# Patient Record
Sex: FEMALE | Race: AMERICAN INDIAN OR ALASKA NATIVE
[De-identification: names, ages, dates, MRNs, and addresses within clinical notes are randomized per-mention and may not be internally consistent; named-entity substitution may affect disease eponyms.]

---

## 2017-12-07 ENCOUNTER — HOSPITAL ENCOUNTER (OUTPATIENT)
Dept: HOSPITAL 5 - TRG | Age: 32
Discharge: HOME | End: 2017-12-07
Attending: OBSTETRICS & GYNECOLOGY
Payer: COMMERCIAL

## 2017-12-07 VITALS — DIASTOLIC BLOOD PRESSURE: 63 MMHG | SYSTOLIC BLOOD PRESSURE: 108 MMHG

## 2017-12-07 DIAGNOSIS — Z3A.36: ICD-10-CM

## 2017-12-07 DIAGNOSIS — O47.03: Primary | ICD-10-CM

## 2017-12-07 PROCEDURE — 59025 FETAL NON-STRESS TEST: CPT

## 2017-12-14 ENCOUNTER — HOSPITAL ENCOUNTER (INPATIENT)
Dept: HOSPITAL 5 - APU | Age: 32
LOS: 3 days | Discharge: HOME | End: 2017-12-17
Attending: OBSTETRICS & GYNECOLOGY | Admitting: OBSTETRICS & GYNECOLOGY
Payer: COMMERCIAL

## 2017-12-14 DIAGNOSIS — D64.9: ICD-10-CM

## 2017-12-14 DIAGNOSIS — O36.5930: ICD-10-CM

## 2017-12-14 DIAGNOSIS — O41.03X0: ICD-10-CM

## 2017-12-14 DIAGNOSIS — O34.211: Primary | ICD-10-CM

## 2017-12-14 DIAGNOSIS — Z3A.37: ICD-10-CM

## 2017-12-14 LAB
BASOPHILS NFR BLD AUTO: 0.2 % (ref 0–1.8)
EOSINOPHIL NFR BLD AUTO: 0.3 % (ref 0–4.3)
HCT VFR BLD CALC: 26 % (ref 30.3–42.9)
HGB BLD-MCNC: 8.4 GM/DL (ref 10.1–14.3)
HIV-1 ANTIGEN P24: (no result)
HIVR-1/2 AB: (no result)
MCH RBC QN AUTO: 28 PG (ref 28–32)
MCHC RBC AUTO-ENTMCNC: 32 % (ref 30–34)
MCV RBC AUTO: 88 FL (ref 79–97)
PLATELET # BLD: 271 K/MM3 (ref 140–440)
RBC # BLD AUTO: 2.96 M/MM3 (ref 3.65–5.03)
WBC # BLD AUTO: 8.2 K/MM3 (ref 4.5–11)

## 2017-12-14 PROCEDURE — 36415 COLL VENOUS BLD VENIPUNCTURE: CPT

## 2017-12-14 PROCEDURE — 87806 HIV AG W/HIV1&2 ANTB W/OPTIC: CPT

## 2017-12-14 PROCEDURE — 86850 RBC ANTIBODY SCREEN: CPT

## 2017-12-14 PROCEDURE — 99211 OFF/OP EST MAY X REQ PHY/QHP: CPT

## 2017-12-14 PROCEDURE — 81001 URINALYSIS AUTO W/SCOPE: CPT

## 2017-12-14 PROCEDURE — 86706 HEP B SURFACE ANTIBODY: CPT

## 2017-12-14 PROCEDURE — 80307 DRUG TEST PRSMV CHEM ANLYZR: CPT

## 2017-12-14 PROCEDURE — 85025 COMPLETE CBC W/AUTO DIFF WBC: CPT

## 2017-12-14 PROCEDURE — 85660 RBC SICKLE CELL TEST: CPT

## 2017-12-14 PROCEDURE — 86803 HEPATITIS C AB TEST: CPT

## 2017-12-14 PROCEDURE — 85014 HEMATOCRIT: CPT

## 2017-12-14 PROCEDURE — 85018 HEMOGLOBIN: CPT

## 2017-12-14 PROCEDURE — 86592 SYPHILIS TEST NON-TREP QUAL: CPT

## 2017-12-14 PROCEDURE — 86901 BLOOD TYPING SEROLOGIC RH(D): CPT

## 2017-12-14 PROCEDURE — G0463 HOSPITAL OUTPT CLINIC VISIT: HCPCS

## 2017-12-14 PROCEDURE — 86900 BLOOD TYPING SEROLOGIC ABO: CPT

## 2017-12-14 PROCEDURE — 86762 RUBELLA ANTIBODY: CPT

## 2017-12-14 PROCEDURE — 88307 TISSUE EXAM BY PATHOLOGIST: CPT

## 2017-12-14 NOTE — HISTORY AND PHYSICAL REPORT
History of Present Illness


Date of examination: 17


Date of admission: 


17 13:45





Chief complaint: 


oligohydramnios





History of present illness: 


33y/o  @ 37+5 weeks who presents to the office to initiate prenatal 

care. The patient had a first trimester ultrasound at 9+3 weeks that was 

consistent with her lmp. She has not received prenatal care during this 

pregnancy. She complains today of having intermittent watery discharge. 

Ultrasound in the office revealed oligohydramnios of 4cm and IUGR with efw 

approximately 2000gm. The ultrasound was also significant for a fetal 

pericardial effusion. The patient has a prior  delivery which she 

states that the provider performed because "the baby won't come down". No 

records are available of her prior pregancies. GBS status is unknown. 








Past History


Past Medical History: no pertinent history


Past Surgical History:  section


Social history: single





- Obstetrical History


Expected Date of Delivery: 17


Actual Gestation: 37 Week(s) 5 Day(s) 


: 7


Para: 4


Hx # Term Pregnancies: 4


Number of  Pregnancies: 0


Spontaneous Abortions: 0


Induced : 2


Number of Living Children: 4





Medications and Allergies


 Allergies











Allergy/AdvReac Type Severity Reaction Status Date / Time


 


No Known Allergies Allergy   Unverified 17 13:53











Active Meds: 


Active Medications





Citric Acid/Sodium Citrate (Bicitra)  30 ml PO ONCE CHARU


   Stop: 17 23:00


Famotidine (Pepcid)  20 mg IV ONCE ONE


   Stop: 17 16:01


Cefazolin Sodium (Ancef/Sterile Water 2 Gm/20 Ml)  2 gm in 20 mls @ 80 mls/hr 

IV PREOP NR


   PRN Reason: Protocol


   Stop: 17 23:00


Lactated Ringer's (Lactated Ringers)  1,000 mls @ 2,250 mls/hr IV PREOP CHARU


   Stop: 12/15/17 15:27


Oxytocin/Sodium Chloride (Pitocin/Ns 20 Unit/1000ml Drip)  20 units in 1,000 

mls @ 0 mls/hr IV TITR CHARU


   PRN Reason: As Directed


Metoclopramide HCl (Reglan)  10 mg IV ONCE ONE


   Stop: 17 16:01











Review of Systems


All systems: negative


Genitourinary: leakage of fluid





- Vital Signs


Vital signs: 


 Vital Signs











Pulse BP


 


 73   101/62 


 


 17 14:31  17 14:31








 











Temp Pulse Resp BP Pulse Ox


 


    73      101/62    


 


    17 14:31     17 14:31   














- Physical Exam


Breasts: Positive: deferred


Cardiovascular: Regular rate


Lungs: Positive: Clear to auscultation


Abdomen: Positive: normal appearance





Results


All other labs normal.








Assessment and Plan





- Patient Problems


(1) No prenatal care in current pregnancy


Current Visit: Yes   Status: Acute   





(2) Previous  delivery affecting pregnancy


Current Visit: Yes   Status: Acute   


Plan to address problem: 


will proceed with repeat  delivery








(3) Oligohydramnios


Current Visit: Yes   Status: Acute   





(4) Intrauterine growth restriction affecting care of mother


Current Visit: Yes   Status: Acute   





(5) Pericardial effusion in fetus


Current Visit: Yes   Status: Acute   


Plan to address problem: 


Neonatologist notified of ultrasound findings

## 2017-12-14 NOTE — PROCEDURE NOTE
OB Delivery Note





- Delivery


Date of Delivery: 17


Surgeon: BRITT BHARDWAJ


Estimated blood loss: other (800ml)





-  Section


Preop diagnosis: repeat 


Postop diagnosis: same


 section procedure:  section, repeat low transverse


Disposition: PACU


Complications: none





- Infant


  ** A


Apgar at 1 minute: 9


Apgar at 5 minutes: 9


Infant Gender: Female (weight 5lbs 1oz)

## 2017-12-14 NOTE — ANESTHESIA CONSULTATION
Anesthesia Consult and Med Hx


Date of service: 12/14/17





- Airway


Anesthetic Teeth Evaluation: Good


ROM Head & Neck: Adequate


Mental/Hyoid Distance: Adequate


Mallampati Class: Class II


Intubation Access Assessment: Probably Good





- Pre-Operative Health Status


ASA Pre-Surgery Classification: ASA2


Proposed Anesthetic Plan: Epidural, Spinal





- Pulmonary


Hx Asthma: No





- Cardiovascular System


Hx Hypertension: No





- Central Nervous System


Hx Seizures: No


Hx Psychiatric Problems: No





- Endocrine


Hx Renal Disease: No


Hx Hypothyroidism: No


Hx Hyperthyroidism: No





- Hematic


Hx Anemia: No


Hx Sickle Cell Disease: No





- Other Systems


Hx Alcohol Use: No

## 2017-12-15 LAB
BILIRUB UR QL STRIP: (no result)
BLOOD UR QL VISUAL: (no result)
HCT VFR BLD CALC: 24.8 % (ref 30.3–42.9)
HGB BLD-MCNC: 8.1 GM/DL (ref 10.1–14.3)
KETONES UR STRIP-MCNC: 20 MG/DL
LEUKOCYTE ESTERASE UR QL STRIP: (no result)
NITRITE UR QL STRIP: (no result)
PH UR STRIP: 6 [PH] (ref 5–7)
PROT UR STRIP-MCNC: (no result) MG/DL
RBC #/AREA URNS HPF: 9 /HPF (ref 0–6)
URINE DRUGS OF ABUSE NOTE: (no result)
UROBILINOGEN UR-MCNC: < 2 MG/DL (ref ?–2)
WBC #/AREA URNS HPF: 9 /HPF (ref 0–6)

## 2017-12-15 RX ADMIN — FERROUS SULFATE TAB 325 MG (65 MG ELEMENTAL FE) SCH: 325 (65 FE) TAB at 08:00

## 2017-12-15 RX ADMIN — FERROUS SULFATE TAB 325 MG (65 MG ELEMENTAL FE) SCH MG: 325 (65 FE) TAB at 20:34

## 2017-12-15 RX ADMIN — OXYCODONE AND ACETAMINOPHEN PRN TAB: 5; 325 TABLET ORAL at 20:34

## 2017-12-15 RX ADMIN — FERROUS SULFATE TAB 325 MG (65 MG ELEMENTAL FE) SCH MG: 325 (65 FE) TAB at 15:40

## 2017-12-15 RX ADMIN — OXYCODONE AND ACETAMINOPHEN PRN TAB: 5; 325 TABLET ORAL at 15:40

## 2017-12-15 NOTE — PROGRESS NOTE
Assessment and Plan





O: VSS AF


PP H/H: 8.1/24.8


A: Stable POD #1


S/P primary C/S IUGR, oligo


Anemia


P: Iron TID


Routine orders





Subjective





- Subjective


Date of service: 12/15/17


Patient reports: appetite normal, pain well controlled, flatus, no voiding 

normally ([place] discontinued not up to void at time of note), no ambulating 

normally (no ambulation)


: doing well, nursing well





Objective





- Vital Signs


Latest vital signs: 


 Vital Signs











  Temp Pulse Resp BP BP Pulse Ox


 


 12/15/17 04:25  98.0 F  66  18   114/67 


 


 12/15/17 00:00  98.0 F  55 L  18   106/53 


 


 17 20:35  98.2 F  63  18   104/66 


 


 17 19:55   59 L  18  100/65   98


 


 17 19:50   59 L  17  100/75   99


 


 17 19:47  98 F     


 


 17 19:45   54 L  18  103/65   99


 


 17 19:40   55 L  17  105/63   99


 


 17 19:35   54 L  18  101/63   99


 


 17 19:30   54 L  18  100/64   99


 


 17 19:25   57 L  17  100/60   99


 


 17 19:20   57 L  17  99/58   99


 


 17 19:15   55 L  18  99/58   100


 


 17 19:10   56 L  16  97/56   100


 


 17 19:05   55 L  16  95/56   100


 


 17 19:00  98.7 F  58 L  16  95/52   100


 


 17 16:41  98.9 F  73  16   101/62 


 


 17 14:31   73   101/62  








 Intake and Output











 12/14/17 12/14/17 12/15/17





 14:59 22:59 06:59


 


Intake Total  2100 240


 


Output Total  350 600


 


Balance  1750 -360


 


Intake:   


 


  IV  2100 


 


  Oral   240


 


Output:   


 


  Urine  350 600


 


    Indwelling Catheter   600


 


Other:   


 


  Total, Intake Amount   240


 


  Total, Output Amount   600


 


  Weight  69.4 kg 








 Patient Weight











 12/15/17





 06:59


 


Weight 69.4 kg














- Exam


Breasts: Present: deferred


Lungs: Present: Normal air movement


Abdomen: Present: normal appearance, soft, distention (mild distention), normal 

bowel sounds (faint)


Vulva: both: normal


Uterus: Present: normal, firm, fundal height below umbilicus (1 below, U, ML).  

Absent: bogginess, tenderness


Extremities: Present: normal


Incision: Present: normal, dry, intact, dressed





- Labs


Labs: 


 Abnormal lab results











  12/14/17 12/15/17 12/15/17 Range/Units





  16:46 05:10 05:48 


 


RBC  2.96 L    (3.65-5.03)  M/mm3


 


Hgb  8.4 L   8.1 L  (10.1-14.3)  gm/dl


 


Hct  26.0 L   24.8 L  (30.3-42.9)  %


 


Seg Neutrophils %  76.9 H    (40.0-70.0)  %


 


Urine WBC (Auto)   9.0 H   (0.0-6.0)  /HPF

## 2017-12-16 RX ADMIN — IBUPROFEN PRN MG: 800 TABLET, FILM COATED ORAL at 21:53

## 2017-12-16 RX ADMIN — FERROUS SULFATE TAB 325 MG (65 MG ELEMENTAL FE) SCH MG: 325 (65 FE) TAB at 10:40

## 2017-12-16 RX ADMIN — OXYCODONE AND ACETAMINOPHEN PRN TAB: 5; 325 TABLET ORAL at 17:00

## 2017-12-16 RX ADMIN — FERROUS SULFATE TAB 325 MG (65 MG ELEMENTAL FE) SCH: 325 (65 FE) TAB at 14:00

## 2017-12-16 RX ADMIN — IBUPROFEN PRN MG: 800 TABLET, FILM COATED ORAL at 03:06

## 2017-12-16 RX ADMIN — OXYCODONE AND ACETAMINOPHEN PRN TAB: 5; 325 TABLET ORAL at 10:40

## 2017-12-16 RX ADMIN — FERROUS SULFATE TAB 325 MG (65 MG ELEMENTAL FE) SCH MG: 325 (65 FE) TAB at 21:53

## 2017-12-16 NOTE — DISCHARGE SUMMARY
Providers





- Providers


Date of Admission: 


17 13:45





Date of discharge: 17


Attending physician: 


PETRONA ANDREW





 





12/15/17 10:09


Consult to Case Management [CONS] Routine 


   Services Needed at Discharge: 


   Notified:: Keesha Leopold [email or web address]


   Phone number called:: [phone]


   Was contact made?: Yes











Primary care physician: 


PETRONA ANDREW








Hospitalization


Reason for admission:  section, IUP at term


Delivery: 


Procedure: repeat low transverse


Episiotomy: none


Laceration: none


Incision: normal, dry, intact


Other postpartum procedures: none


Postpartum complications: none


Discharge diagnosis: IUP at term delivered


[place] baby: female


Condition at discharge: Good


Disposition: DC-01 TO HOME OR SELFCARE





Plan





- Discharge Medications


Prescriptions: 


Docusate Sodium [Colace] 100 mg PO BID PRN #60 capsule


 PRN Reason: Constipation


Ferrous Sulfate [Feosol 325 MG tab] 325 mg PO TID #90 tablet


Ibuprofen [Motrin 800 MG tab] 800 mg PO Q6H PRN #30 tablet


 PRN Reason: Pain, Mild (1-3)


oxyCODONE /ACETAMINOPHEN [Percocet 5/325 mg] 2 tab PO Q4H PRN #30 tablet


 PRN Reason: Pain, Moderate (4-6)





- Provider Discharge Summary


Activity: routine, no sex for 6 weeks, no heavy lifting 4 weeks, no strenuous 

exercise


Diet: routine


Additional instructions: 


[]  Smoking cessation referral if applicable(refer to patient education folder 

for contact #)


[]  Refer to [facility]'s [facility] Booklet








Call your doctor immediately for:


* Fever > 100.5


* Heavy vaginal bleeding ( >1 pad per hour)


* Severe persistent headache


* Shortness of breath


* Reddened, hot, painful area to leg or breast


* Drainage or odor from incision.





* Keep incision clean and dry at all times and follow doctor's instructions 

regarding bathing/showering











- Follow up plan


Follow up: 


PETRONA ANDREW MD [Primary Care Provider] - 14 Days (RTO 2 weeks incision check)

## 2017-12-16 NOTE — PROGRESS NOTE
Assessment and Plan





O: VSS AF 


PP H/H: 8.1/24.8


A: POD # 2


Anemia


P: Iron TID


Routine PP orders





Subjective





- Subjective


Date of service: 17


Patient reports: appetite normal, voiding normally, pain well controlled, flatus

, ambulating normally


[place]: doing well





Objective





- Vital Signs


Latest vital signs: 


 Vital Signs











  Temp Pulse Resp BP BP Pulse Ox


 


 17 08:41  98.5 F  82  18   104/62 


 


 17 03:45  98.7 F  69  20  112/65   99


 


 12/15/17 17:16   76     98


 


 12/15/17 16:55  98.4 F  68  20   109/68  98








 Intake and Output











 12/15/17 12/16/17 12/16/17





 22:59 06:59 14:59


 


Intake Total 910  120


 


Balance 910  120


 


Intake:   


 


  Oral 550  120


 


  Intake, Free Water 360  


 


Other:   


 


  Total, Intake Amount 550  120


 


  # Voids   


 


    Void 1  1














- Exam


Breasts: Present: deferred


Lungs: Present: Normal air movement


Abdomen: Present: normal appearance, soft, normal bowel sounds.  Absent: 

distention


Vulva: both: normal


Uterus: Present: normal, firm, fundal height below umbilicus (2 below U, ML).  

Absent: bogginess


Extremities: Present: normal


Incision: Present: normal, dry, intact, dressed (removed)

## 2017-12-17 VITALS — DIASTOLIC BLOOD PRESSURE: 66 MMHG | SYSTOLIC BLOOD PRESSURE: 111 MMHG

## 2017-12-17 RX ADMIN — OXYCODONE AND ACETAMINOPHEN PRN TAB: 5; 325 TABLET ORAL at 12:21

## 2017-12-17 RX ADMIN — FERROUS SULFATE TAB 325 MG (65 MG ELEMENTAL FE) SCH MG: 325 (65 FE) TAB at 12:22

## 2017-12-17 RX ADMIN — OXYCODONE AND ACETAMINOPHEN PRN TAB: 5; 325 TABLET ORAL at 07:09

## 2019-02-12 ENCOUNTER — HOSPITAL ENCOUNTER (EMERGENCY)
Dept: HOSPITAL 5 - ED | Age: 34
Discharge: HOME | End: 2019-02-12
Payer: COMMERCIAL

## 2019-02-12 VITALS — SYSTOLIC BLOOD PRESSURE: 111 MMHG | DIASTOLIC BLOOD PRESSURE: 68 MMHG

## 2019-02-12 DIAGNOSIS — Z3A.12: ICD-10-CM

## 2019-02-12 DIAGNOSIS — O03.9: Primary | ICD-10-CM

## 2019-02-12 LAB
BASOPHILS # (AUTO): 0 K/MM3 (ref 0–0.1)
BASOPHILS NFR BLD AUTO: 0.5 % (ref 0–1.8)
BILIRUB UR QL STRIP: (no result)
BLOOD UR QL VISUAL: (no result)
EOSINOPHIL # BLD AUTO: 0.1 K/MM3 (ref 0–0.4)
EOSINOPHIL NFR BLD AUTO: 0.7 % (ref 0–4.3)
HCT VFR BLD CALC: 32.8 % (ref 30.3–42.9)
HGB BLD-MCNC: 11.1 GM/DL (ref 10.1–14.3)
LYMPHOCYTES # BLD AUTO: 2 K/MM3 (ref 1.2–5.4)
LYMPHOCYTES NFR BLD AUTO: 24.6 % (ref 13.4–35)
MCHC RBC AUTO-ENTMCNC: 34 % (ref 30–34)
MCV RBC AUTO: 95 FL (ref 79–97)
MONOCYTES # (AUTO): 0.4 K/MM3 (ref 0–0.8)
MONOCYTES % (AUTO): 4.6 % (ref 0–7.3)
PH UR STRIP: 7 [PH] (ref 5–7)
PLATELET # BLD: 328 K/MM3 (ref 140–440)
RBC # BLD AUTO: 3.45 M/MM3 (ref 3.65–5.03)
RBC #/AREA URNS HPF: 77 /HPF (ref 0–6)
UROBILINOGEN UR-MCNC: < 2 MG/DL (ref ?–2)
WBC #/AREA URNS HPF: 84 /HPF (ref 0–6)

## 2019-02-12 PROCEDURE — 86850 RBC ANTIBODY SCREEN: CPT

## 2019-02-12 PROCEDURE — 76817 TRANSVAGINAL US OBSTETRIC: CPT

## 2019-02-12 PROCEDURE — 86901 BLOOD TYPING SEROLOGIC RH(D): CPT

## 2019-02-12 PROCEDURE — 86900 BLOOD TYPING SEROLOGIC ABO: CPT

## 2019-02-12 PROCEDURE — 76801 OB US < 14 WKS SINGLE FETUS: CPT

## 2019-02-12 PROCEDURE — 99291 CRITICAL CARE FIRST HOUR: CPT

## 2019-02-12 PROCEDURE — 36415 COLL VENOUS BLD VENIPUNCTURE: CPT

## 2019-02-12 PROCEDURE — 81001 URINALYSIS AUTO W/SCOPE: CPT

## 2019-02-12 PROCEDURE — 84702 CHORIONIC GONADOTROPIN TEST: CPT

## 2019-02-12 PROCEDURE — 85025 COMPLETE CBC W/AUTO DIFF WBC: CPT

## 2019-02-12 NOTE — ULTRASOUND REPORT
FINAL REPORT



PROCEDURE:  US OB TRANSVAGINAL



TECHNIQUE:  Real-time transvaginal sonography of the uterus, placenta, amniotic fluid, adnexa, and fe
tus was performed with image documentation. Measurements were obtained to determine fetal age/size. M
-mode Doppler was used to document fetal heartbeat. CPT 63941



HISTORY:  12 wk fetus expelled from vaginal check for retata 



COMPARISON:  2019



FINDINGS:  

Previously noted spontaneous  in progress is no longer identified. There are no teen products
 of conception. 



Ovaries are unremarkable. 



There is no free fluid. 



IMPRESSION:  

Previously noted spontaneous  in progress is no longer identified. There are no teen products
 of conception.

## 2019-02-12 NOTE — EMERGENCY DEPARTMENT REPORT
Addendum entered and electronically signed by ISELA MONTIEL PA  19 

04:05: 








Blank Doc





- Documentation


Documentation: 





Please add to my visit no critical time of 1 hour.





Original Note:








ED Female  HPI





- General


Chief complaint: Vaginal Bleeding


Stated complaint: VAGINAL BLEEDING


Time Seen by Provider: 19 02:09


Source: patient


Mode of arrival: Ambulatory


Limitations: No Limitations





- History of Present Illness


Initial comments: 





33-year-old -American female presents to the emergency room for complaint

of vaginal bleeding since last Thursday.  Patient states that there is a  and  she just had blood when she wipes.  Patient stated that on 

Monday when she went to the bathroom and saw blood it was as if her period had 

started.  Patient states that she had placed tissue in her panties and as she 

was walking back to where she was working she felt this blood.  Patient states 

that the bleeding and ran down her leg.  She states that they're worse clots 

mixed with blood.  Patient reports that she had abdominal cramping that felt 

like labor pains.  Patient states that she had filled a  a baby diaper of blood 

today.  Patient reports that her last known menstrual period was 2018 

she reported she had 2 cycles at that time.  Patient reports she has not had any

cycle since then.  Patient is  7 para 5.  Patient did not check an 

over-the-counter pregnancy test.  She has not been on birth control since 

September.  She reports she was having nausea and vomiting last month but now 

has increased saliva and is having to spit frequently.  Patient states if she 

swallows her spit it makes her vomit.


MD Complaint: vaginal bleeding


-: days(s) (5)


Radiation: suprapubic


Severity: moderate


Quality: cramping


Consistency: intermittent


Improves with: medication


Are you Pregnant Now?: No


Associated Symptoms: vaginal bleeding





- Related Data


Sexually active: Yes


: 7


Para: 5


                                  Previous Rx's











 Medication  Instructions  Recorded  Last Taken  Type


 


Ibuprofen [Motrin 800 MG tab] 800 mg PO Q6H PRN #30 tablet 12/15/17 Unknown Rx


 


oxyCODONE /ACETAMINOPHEN [Percocet 2 tab PO Q4H PRN #30 tablet 12/15/17 Unknown 

Rx





5/325 mg]    


 


Docusate Sodium [Colace] 100 mg PO BID PRN #60 capsule 17 Unknown Rx


 


Ferrous Sulfate [Feosol 325 MG tab] 325 mg PO TID #90 tablet 17 Unknown Rx


 


Methylergonovine [Methergine] 0.2 mg PO Q8HR 2 Days #6 tablet 19 Unknown 

Rx











                                    Allergies











Allergy/AdvReac Type Severity Reaction Status Date / Time


 


No Known Allergies Allergy   Unverified 17 13:53














ED Review of Systems


ROS: 


Stated complaint: VAGINAL BLEEDING


Other details as noted in HPI





Comment: All other systems reviewed and negative


Gastrointestinal: abdominal pain


Genitourinary: other (vaginal bleeding)





ED Past Medical Hx





- Past Medical History


Previous Medical History?: No


Hx Hypertension: No


Hx Congestive Heart Failure: No


Hx Diabetes: No


Hx Deep Vein Thrombosis: No


Hx Renal Disease: No


Hx Sickle Cell Disease: No


Hx Seizures: No


Hx Asthma: No


Hx COPD: No


Hx HIV: No





- Surgical History


Past Surgical History?: Yes


Additional Surgical History: C sections x 2





- Social History


Smoking Status: Never Smoker





- Medications


Home Medications: 


                                Home Medications











 Medication  Instructions  Recorded  Confirmed  Last Taken  Type


 


Ibuprofen [Motrin 800 MG tab] 800 mg PO Q6H PRN #30 tablet 12/15/17  Unknown Rx


 


oxyCODONE /ACETAMINOPHEN [Percocet 2 tab PO Q4H PRN #30 tablet 12/15/17  Unknown

 Rx





5/325 mg]     


 


Docusate Sodium [Colace] 100 mg PO BID PRN #60 capsule 17  Unknown Rx


 


Ferrous Sulfate [Feosol 325 MG tab] 325 mg PO TID #90 tablet 17  Unknown 

Rx


 


Methylergonovine [Methergine] 0.2 mg PO Q8HR 2 Days #6 tablet 19  Unknown 

Rx














ED Physical Exam





- General


Limitations: No Limitations


General appearance: alert, in no apparent distress





- Head


Head exam: Present: atraumatic, normocephalic





- Eye


Eye exam: Present: EOMI





- ENT


ENT exam: Present: mucous membranes moist





- Neck


Neck exam: Present: normal inspection, full ROM





- Respiratory


Respiratory exam: Present: normal lung sounds bilaterally.  Absent: respiratory 

distress





- Cardiovascular


Cardiovascular Exam: Present: regular rate, normal rhythm.  Absent: systolic 

murmur, diastolic murmur, rubs, gallop





- GI/Abdominal


GI/Abdominal exam: Present: soft, normal bowel sounds.  Absent: distended, 

tenderness, guarding





- Extremities Exam


Extremities exam: Present: normal inspection





- Back Exam


Back exam: Present: normal inspection, full ROM





- Neurological Exam


Neurological exam: Present: alert, oriented X3





- Psychiatric


Psychiatric exam: Present: normal affect, normal mood





- Skin


Skin exam: Present: warm, dry, intact, normal color.  Absent: rash





ED Course


                                   Vital Signs











  19





  01:11 02:28


 


Temperature 100.2 F H 


 


Pulse Rate 82 


 


Respiratory 20 1 L





Rate  


 


Blood Pressure 114/59 


 


O2 Sat by Pulse 99 





Oximetry  














- Vaginal Delivery


Consent Obtained: verbal consent


Time Out Performed: Yes


Indication: precipitous vaginal deliv


Presentation: other (fetus vaginal now)


Amniotic Fluid: blood-tinged


Patient Tolerated Procedure: well


Complications: other (12 week fetus death)





ED Medical Decision Making





- Lab Data


Result diagrams: 


                                 19 01:47








- Radiology Data


Radiology results: report reviewed





FINAL REPORT 





PROCEDURE: US OB lt; = 14 WEEKS FETUS 





TECHNIQUE: Real-time transabdominal sonography of the uterus, placenta, amniotic

 fluid, adnexa, 


and fetus was performed with image documentation. Measurements were obtained to 

determine fetal 


age/size. M-mode Doppler was used to document fetal heartbeat. CPT 26621 





HISTORY: hCG 898, vag bleeding with cramps 





COMPARISON: No prior studies are available for comparison. 





FINDINGS: 


Uterus measures 12.8 centimeters in length. There is no intrauterine pregnancy. 

There is a fetus in


the vaginal canal is without cardiac activity. Crown-rump length is 

approximately 5.7 centimeters 


correspond to approximate gestational age of 12 weeks and 2 days. 





The ovaries are not seen. 





IMPRESSION: 


Spontaneous  in progress. Nonviable fetus is in the vaginal canal. 











Transcribed By: CO 


Dictated By: TAISHA AWAD MD 


Electronically Authenticated By: TAISHA AWAD MD 


Signed Date/Time: 19 











DD/DT: 19 


TD/TT: 19





FINAL REPORT 





PROCEDURE: US OB TRANSVAGINAL 





TECHNIQUE: Real-time transvaginal sonography of the uterus, placenta, amniotic 

fluid, adnexa, and 


fetus was performed with image documentation. Measurements were obtained to 

determine fetal 


age/size. M-mode Doppler was used to document fetal heartbeat. CPT 00327 





HISTORY: 12 wk fetus expelled from vaginal check for retata 





COMPARISON: 2019 





FINDINGS: 


Previously noted spontaneous  in progress is no longer identified. There

 are no teen 


products of conception. 





Ovaries are unremarkable. 





There is no free fluid. 





IMPRESSION: 


Previously noted spontaneous  in progress is no longer identified. There

 are no teen 


products of conception. 











Transcribed By: CO 


Dictated By: TAISHA AWAD MD 


Electronically Authenticated By: TAISHA AWAD MD 


Signed Date/Time: 19 0656 





- Medical Decision Making





Patient has been evaluated by this provider in fast track.


CBC CMP hCG urinalysis OB ultrasound with transvaginal ordered


Received a call from ultrasound reporting that fetus was in the vaginal not able

 to do the transvaginal exam.


Provider performed a vaginal exam located fetal sac in two thirds of the vaginal

 canal.


Provider removed fetal sac which had burst with amniotic fluid and fetus 

removed.  After fetus removed patient was able to bear down and remove placenta.


Fetus and after birth has been sent to pathology.





Spoke to Dr. Corbin Travis obstetrician on-call.  Discussed case with provider 

he recommends to now do the vaginal OB probe exam to be sure all product of 

conception has been expelled.  Dr. Travis recommends patient to be placed 

Methegine 0.2mg [id number] for 2 day.  Dr. Corbin Travis like the patient to be 

evaluated in his office in the next 2-3 days.  Patient can take Tylenol or 

Motrin for pain and its treatment.


Critical Care Time: Yes


Critical care time in (mins) excluding proc time.: 30


Critical care attestation.: 


If time is entered above; I have spent that time in minutes in the direct care 

of this critically ill patient, excluding procedure time.








ED Disposition


Clinical Impression: 


 Spontaneous miscarriage





Disposition: DC-01 TO HOME OR SELFCARE


Is pt being admited?: No


Does the pt Need Aspirin: No


Condition: Stable


Instructions:  Spontaneous Miscarriage (ED)


Additional Instructions: 


Please take the medication as prescribed.  Tylenol and/or Motrin for pain 

management.  It is very important for you to follow up with Dr. Corbin Travis 2-

3 days.  I have listed his information below.  He is an obstetrician.


Prescriptions: 


Methylergonovine [Methergine] 0.2 mg PO Q8HR 2 Days #6 tablet


Referrals: 


CORBIN TRAVIS MD [Staff Physician] - 3-5 Days


Forms:  Work/School Release Form(ED), Accompanied Note

## 2019-02-12 NOTE — ULTRASOUND REPORT
FINAL REPORT



PROCEDURE:  US OB &lt; = 14 WEEKS FETUS



TECHNIQUE:  Real-time transabdominal sonography of the uterus, placenta, amniotic fluid, adnexa, and 
fetus was performed with image documentation. Measurements were obtained to determine fetal age/size.
 M-mode Doppler was used to document fetal heartbeat. CPT 29045 



HISTORY:  hCG 898, vag bleeding with cramps 



COMPARISON:  No prior studies are available for comparison.



FINDINGS:  

Uterus measures 12.8 centimeters in length. There is no intrauterine pregnancy. There is a fetus in t
he vaginal canal is without cardiac activity. Crown-rump length is approximately 5.7 centimeters esa
espond to approximate gestational age of 12 weeks and 2 days. 



The ovaries are not seen. 



IMPRESSION:  

Spontaneous  in progress. Nonviable fetus is in the vaginal canal.

## 2019-08-12 ENCOUNTER — HOSPITAL ENCOUNTER (EMERGENCY)
Dept: HOSPITAL 5 - ED | Age: 34
LOS: 1 days | Discharge: HOME | End: 2019-08-13
Payer: COMMERCIAL
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
## 2019-08-12 DIAGNOSIS — Z98.890: ICD-10-CM

## 2019-08-12 DIAGNOSIS — S62.336A: Primary | ICD-10-CM

## 2019-08-12 DIAGNOSIS — Z79.899: ICD-10-CM
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
## 2019-08-12 PROCEDURE — 99284 EMERGENCY DEPT VISIT MOD MDM: CPT

## 2019-08-12 NOTE — EMERGENCY DEPARTMENT REPORT
Blank Doc





- Documentation


Documentation: 





This is a 33-year-old female that presents with right hand pain after punching 

the wall.





This initial assessment/diagnostic orders/clinical plan/treatment(s) is/are 

subject to change based on patient's health status, clinical progression and re-

assessment by fellow clinical providers in the ED.  Further treatment and workup

at subsequent clinical providers discretion.  Patient/guardians urged not to 

elope from the ED as their condition may be serious if not clinically assessed 

and managed.  Initial orders include:


1- Patient sent to [facility] for further evaluation and treatment


2- xray

## 2019-08-13 VITALS — SYSTOLIC BLOOD PRESSURE: 113 MMHG | DIASTOLIC BLOOD PRESSURE: 64 MMHG

## 2019-08-13 NOTE — EMERGENCY DEPARTMENT REPORT
Upper Extremity





- HPI


Chief Complaint: Extremity Injury, Upper


Stated Complaint: RT PINKY/LIGHT HEADED


Time Seen by Provider: 08/12/19 22:14


Upper Extremity: Right Hand (right 5th metacarpal pain, swelling and deformity)


Occurred When: Today


Mechanism: Hit with Object (punched the wall in anger)


Severity: severe


Symptoms: Yes Pain with Movement, Yes Deformity, Yes Limited Range of Movement, 

Yes Swelling, No Numbness, No Weakness, No Bruising/Ecchymosis, No Laceration or

Abrasion


Other History: Patient is a 33-year-old -American female with no past 

medical history presents to the ED with complaint of acute onset persistence of 

a right hand pain with swelling and deformity on the right fifth metacarpal afte

r she punched the wall in anger about one half ago.  Patient states that she is 

unable to perform range of motion of the right hand because of severe pain.  

Patient denies numbness or tingling or weakness of her right hand, dizziness, 

nausea, vomiting or wrist pain.





ED Review of Systems


ROS: 


Stated complaint: RT PINKY/LIGHT HEADED


Other details as noted in HPI





Constitutional: denies: chills, fever


Eyes: denies: eye pain, eye discharge, vision change


ENT: denies: ear pain, throat pain


Respiratory: denies: cough, shortness of breath, wheezing


Cardiovascular: denies: chest pain, palpitations


Endocrine: no symptoms reported


Gastrointestinal: denies: abdominal pain, nausea, diarrhea


Genitourinary: denies: urgency, dysuria, discharge


Musculoskeletal: joint swelling (right hand), arthralgia (right hand).  denies: 

back pain


Skin: denies: rash, lesions


Neurological: denies: headache, weakness, paresthesias


Psychiatric: denies: anxiety, depression


Hematological/Lymphatic: denies: easy bleeding, easy bruising





ED Past Medical Hx





- Past Medical History


Previous Medical History?: No


Hx Hypertension: No


Hx Congestive Heart Failure: No


Hx Diabetes: No


Hx Deep Vein Thrombosis: No


Hx Renal Disease: No


Hx Sickle Cell Disease: No


Hx Seizures: No


Hx Asthma: No


Hx COPD: No


Hx HIV: No





- Surgical History


Past Surgical History?: Yes


Additional Surgical History: C sections x 2





- Social History


Smoking Status: Never Smoker





- Medications


Home Medications: 


                                Home Medications











 Medication  Instructions  Recorded  Confirmed  Last Taken  Type


 


Ibuprofen [Motrin 800 MG tab] 800 mg PO Q6H PRN #30 tablet 12/15/17  Unknown Rx


 


oxyCODONE /ACETAMINOPHEN [Percocet 2 tab PO Q4H PRN #30 tablet 12/15/17  Unknown

 Rx





5/325 mg]     


 


Docusate Sodium [Colace] 100 mg PO BID PRN #60 capsule 12/16/17  Unknown Rx


 


Ferrous Sulfate [Feosol 325 MG tab] 325 mg PO TID #90 tablet 12/16/17  Unknown 

Rx


 


Methylergonovine [Methergine] 0.2 mg PO Q8HR 2 Days #6 tablet 02/12/19  Unknown 

Rx


 


Acetaminophen/Codeine [Tylenol 1 tab PO Q6H PRN #15 tab 08/13/19  Unknown Rx





/Codeine # 3 tab]     


 


Cyclobenzaprine [Flexeril] 10 mg PO TID PRN #15 tablet 08/13/19  Unknown Rx


 


Ibuprofen [Motrin] 600 mg PO Q8H PRN #20 tablet 08/13/19  Unknown Rx














Upper Extremity Exam





- Exam


General: 


Vital signs noted. No distress. Alert and acting appropriately.





Head and Torso: No HEENT Abnormality, No Neck Tenderness, No Chest/Lungs 

Abnormality, No Abdominal Tenderness, No Back Tenderness


Shoulder Exam: Yes Normal Range of Motion in Shoulder, No Shoulder Tenderness, 

No Clavicle Tenderness, No Shoulder Deformity, No AC Joint Tenderness


Arm Exam: No Arm/Humerus Tenderness, No Arm Deformity


Elbow: Yes Normal Range of Motion in Elbow, No Elbow Tenderness, No Elbow 

Deformity


Forearm: No Forearm Tenderness, No Forearm Deformity, No Pain with Pronation, No

 Pain with Supination


Wrist: Yes Normal ROM in Wrist, No Wrist Tenderness, No Wrist Deformity, No 

Snuffbox Tenderness, No Pain with Axial Thumb Compression


Hand: Yes Hand Tenderness (right), Yes Hand Deformity (right), Yes Digit 

Tenderness (right 5th), No Normal ROM in Digit(s) (limited due to pain), No 

Digit(s) Deformity, No Tendon Dysfunction


CMS Exam: No Broken Skin, No Normal Distal Pulses, No Normal Capillary Refill, 

No Normal Distal Sensation


Hand L/R Back: 


                            __________________________














                            __________________________





 1 - Swollen severely tender and deformed








ED Course


                                   Vital Signs











  08/12/19





  22:15


 


Temperature 99.6 F


 


Pulse Rate 83


 


Respiratory 18





Rate 


 


Blood Pressure 115/77


 


O2 Sat by Pulse 100





Oximetry 














- Reevaluation(s)


Reevaluation #1: 





08/13/19 05:34


This is a 33-year-old American female who presented to the ED with right hand 

pain and swelling with deformity on the lateral right fifth metacarpal after she

 punched the wall in anger.  Right hand x-ray shows displaced distal right fifth

 metacarpal at the neck.  Patient was treated for pain in the ED and the right 

hand was splinted with ulnar gutter splint.  Patient was discharged home on pain

 medications and given a referral to the orthopedic surgeon on call Dr. Dunn 

for further evaluation.  Patient was advised to contact dr. Dunn's office to 

schedule an appointment.  patient was advised to return to the ed immediately if

 symptoms get worse.





ED Medical Decision Making





- Radiology Data


Radiology results: report reviewed, image reviewed





Right hand pain and swelling with deformity on the lateral right fifth 

metacarpal after she punched the wall in anger.  Right hand x-ray shows di

splaced distal right fifth metacarpal at the neck.  





- Medical Decision Making





This is a 33-year-old American female who presented to the ED with right hand 

pain and swelling with deformity on the lateral right fifth metacarpal after she

 punched the wall in anger.  Right hand x-ray shows displaced distal right fifth

 metacarpal at the neck.  Patient was treated for pain in the ED and the right 

hand was splinted with ulnar gutter splint.  Patient was discharged home on pain

 medications and given a referral to the orthopedic surgeon on call Dr. Dunn 

for further evaluation.  Patient was advised to contact dr. Dunn's office to 

schedule an appointment.  patient was advised to return to the ed immediately if

 symptoms get worse.





- Differential Diagnosis


right hand contusion; boxer fracture, hand sprain


Critical care attestation.: 


If time is entered above; I have spent that time in minutes in the direct care 

of this critically ill patient, excluding procedure time.








ED Disposition


Clinical Impression: 


Closed right hand fracture


Qualifiers:


 Encounter type: initial encounter Qualified Code(s): S62.91XA - Unspecified fr

acture of right wrist and hand, initial encounter for closed fracture





Displaced fracture of neck of right fifth metacarpal bone


Qualifiers:


 Encounter type: initial encounter Fracture type: closed Qualified Code(s): 

S62.336A - Displaced fracture of neck of fifth metacarpal bone, right hand, 

initial encounter for closed fracture





Disposition: DC-01 TO HOME OR SELFCARE


Is pt being admited?: No


Does the pt Need Aspirin: No


Condition: Stable


Instructions:  Hand Fracture (ED), Boxer Fracture (ED)


Additional Instructions: 


Take medications with follow up with Dr. Dunn, the Orthopedic Surgeon in 2 

days for reevaluation. Return to the ED immediately if symptoms. Contact Dr. Dunn's office to schedule an appointment


Prescriptions: 


Cyclobenzaprine [Flexeril] 10 mg PO TID PRN #15 tablet


 PRN Reason: Muscle Spasm


Ibuprofen [Motrin] 600 mg PO Q8H PRN #20 tablet


 PRN Reason: Pain


Acetaminophen/Codeine [Tylenol /Codeine # 3 tab] 1 tab PO Q6H PRN #15 tab


 PRN Reason: Pain , Severe (7-10)


Referrals: 


SUNG DUNN MD [Staff Physician] - 3-5 Days


Forms:  Work/School Release Form(ED)


Time of Disposition: 05:29


Print Language: ENGLISH





ED General adult EXAM





- General


General appearance: alert


Limitations: No Limitations





- Head


Head exam: Positive: atraumatic, normocephalic, normal inspection





- Eye


Eye exam: normal appearance, PERRL

## 2020-02-28 NOTE — OPERATIVE REPORT
Operative Report


Operative Report: 


Date of surgery: 2017





Preoperative diagnosis: Pregnancy @ 37+5 weeks; oligohydramnios; intrauterine 

growth restriction; fetal pericardial effusions





Postoperative diagnosis: Same as above





Procedure: Repeat low transverse  delivery





Surgeon: Nadia Harmon M.D.





Anesthesia: Regional





Estimated blood loss: 800 mL





IV fluids: 2000 mL





Urine output: 100 mL





Findings: Liveborn female infant with Apgars of 9 and 9 weight 5 lbs. 1 oz.





Indications: 32-year-old  at 37+5 weeks who presents with no prenatal 

care during this pregnancy.  On OB ultrasound the patient was found to have 

oligohydramnios, intrauterine growth restriction, and pericardial effusions.





Procedure:


     The patient was taken to the operating room and given regional anesthesia 

without complication.  She was prepped and draped in a normal sterile fashion.  

A Pfannenstiel skin incision was made down to layer the fascia which was nicked 

in the midline 





extended laterally with the Bovie cautery.  The superior aspect of the rectus 

fascia was grasped with Olivia clamps x2 and the rectus muscles  off 

sharply.  This was done in inferior fashion as well.  The rectus muscle 

 midline and peritoneum 





entered bluntly.  Lysis adhesions were performed upon entry into the peritoneal 

cavity. An Sebastian retractor was then inserted.  A bladder blade was placed.  

The vesicouterine peritoneum was then entered sharply with Metzenbaum scissors.

  A bladder flap was created digitally.  A low transverse uterine incision was 

then made and 





extended digitally.  There was clear fluid  upon entry into the uterine cavity.

  The fetal head was delivered through the incision with fundal pressure.  The 

cord was clamped and cut x2 and infant was passed off to pediatrics.  The 

placenta was then manually 





extracted.  The uterus was then exteriorized and cleared of clots and debris.  

The uterine incision was then closed in a running locked fashion with 0 Vicryl 

additional imbricating stitch was applied for 2 layer closure.  





 The posterior cul-de-sac was then copiously irrigated.  The uterus was 

replaced back into the abdomen and pelvis were the gutters were then irrigated.

  The Sebastian retractor was then removed.  The peritoneum was then 

reapproximated with 3-0 Vicryl 





incorporating the rectus muscle.  The fascia was then closed with 0 Vicryl in a 

running fashion.  The skin was then reapproximated with 3-0 Monocryl on a Kb 

needle subcuticular fashion.  Steri-Strips to place across the incision and a 

Crede procedures 





performed at the end of the surgery.  A pressure dressing was applied to the 

incision.  The surgery productive of a liveborn female infant with Apgars of 9 

and 9 weight 5 lbs. 1 oz.  The patient was taken to the recovery room in stable 

condition.  All sponge laps and needle 





counts correct x2.
Vaginal

## 2022-10-12 NOTE — XRAY REPORT
Right hand-3 views



INDICATION:  Acute generalized right hand pain.



COMPARISON: None.



IMPRESSION:  Comminuted fracture involving the neck of the little finger metacarpal with mild volar a
ngulation of the distal fracture component and surrounding soft tissue swelling.  No significant DJD.




Signer Name: Dewey Subramanian MD 

Signed: 8/13/2019 1:22 AM

 Workstation Name: [id number]